# Patient Record
Sex: MALE | Race: WHITE | Employment: OTHER | URBAN - METROPOLITAN AREA
[De-identification: names, ages, dates, MRNs, and addresses within clinical notes are randomized per-mention and may not be internally consistent; named-entity substitution may affect disease eponyms.]

---

## 2020-06-23 ENCOUNTER — HOSPITAL ENCOUNTER (OUTPATIENT)
Dept: ULTRASOUND IMAGING | Age: 70
Discharge: HOME | End: 2020-06-23
Attending: FAMILY MEDICINE
Payer: MEDICARE

## 2020-06-23 DIAGNOSIS — R19.05 PERIUMBILICAL MASS: ICD-10-CM

## 2020-06-23 PROCEDURE — 76705 ECHO EXAM OF ABDOMEN: CPT

## 2021-12-01 ENCOUNTER — OFFICE VISIT (OUTPATIENT)
Dept: NEUROLOGY | Age: 71
End: 2021-12-01
Payer: MEDICARE

## 2021-12-01 VITALS
SYSTOLIC BLOOD PRESSURE: 138 MMHG | DIASTOLIC BLOOD PRESSURE: 69 MMHG | HEART RATE: 70 BPM | RESPIRATION RATE: 14 BRPM | WEIGHT: 152.9 LBS | TEMPERATURE: 97.7 F | OXYGEN SATURATION: 98 % | HEIGHT: 70 IN | BODY MASS INDEX: 21.89 KG/M2

## 2021-12-01 DIAGNOSIS — R41.0 CONFUSION: ICD-10-CM

## 2021-12-01 DIAGNOSIS — R41.3 MEMORY LOSS: ICD-10-CM

## 2021-12-01 DIAGNOSIS — R68.89 OTHER GENERAL SYMPTOMS AND SIGNS: ICD-10-CM

## 2021-12-01 DIAGNOSIS — R41.89 COGNITIVE DECLINE: Primary | ICD-10-CM

## 2021-12-01 PROCEDURE — 1101F PT FALLS ASSESS-DOCD LE1/YR: CPT | Performed by: PSYCHIATRY & NEUROLOGY

## 2021-12-01 PROCEDURE — G8420 CALC BMI NORM PARAMETERS: HCPCS | Performed by: PSYCHIATRY & NEUROLOGY

## 2021-12-01 PROCEDURE — 95816 EEG AWAKE AND DROWSY: CPT | Performed by: PSYCHIATRY & NEUROLOGY

## 2021-12-01 PROCEDURE — 3017F COLORECTAL CA SCREEN DOC REV: CPT | Performed by: PSYCHIATRY & NEUROLOGY

## 2021-12-01 PROCEDURE — G8427 DOCREV CUR MEDS BY ELIG CLIN: HCPCS | Performed by: PSYCHIATRY & NEUROLOGY

## 2021-12-01 PROCEDURE — G8510 SCR DEP NEG, NO PLAN REQD: HCPCS | Performed by: PSYCHIATRY & NEUROLOGY

## 2021-12-01 PROCEDURE — G8536 NO DOC ELDER MAL SCRN: HCPCS | Performed by: PSYCHIATRY & NEUROLOGY

## 2021-12-01 PROCEDURE — 99204 OFFICE O/P NEW MOD 45 MIN: CPT | Performed by: PSYCHIATRY & NEUROLOGY

## 2021-12-01 RX ORDER — ROSUVASTATIN CALCIUM 40 MG/1
TABLET, COATED ORAL
COMMUNITY
Start: 2021-09-25 | End: 2021-12-01

## 2021-12-01 NOTE — PROGRESS NOTES
[facility] and [address] at [facility] at [address] [facility] [address] [place], [address] [facility] [address] [facility], [address] [place] [address]  [phone] Office  [phone]           Referring: Nicolas Hendricks MD  [address] [facility],  [address] [facility]    Chief Complaint   Patient presents with    New Patient     Memory Concerns x 6 months + // worsening      40-year-old gentleman presents today accompanied by his wife for evaluation of memory difficulty. Both provide history. He says that he is just not as sharp as previous. He has been having some difficulty with direction. He notes that he just does not recall as well as he used to. He has difficulty carrying on conversations. He has no difficulty with bills. He does not get lost.  Does not have trouble with the TV remote of the cell phone. His mother had dementia for symptoms starting in his 76s. Wife notes that he is more forgetful. He will forget conversations or will remember conversations differently than they occurred. This has been progressive over the last 6 months and worsening. He has had trouble with forgetting bills. He will set them aside and has to be reminded. He has more difficulty with directions although he is never been good with directions. No dangerous behaviors. She does say that he has been drinking more than he thinks he is drinking and she does not think that he is trying to hide it he just does not realize how much he is drinking. She notes that he has been buying boxes of wine that have 34 glasses in them and while he thinks he is only having 2 or 3 glasses a day he is having large glasses which if you go by standard beverage sizes or serving sizes he is actually having quite a bit more and she is unsure as to how much that may be contributing. No past medical history on file.     No past surgical history on file. He takes no current medicines    No Known Allergies    Social History     Tobacco Use    Smoking status: Former Smoker    Smokeless tobacco: Never Used   Substance Use Topics    Alcohol use: Yes     Comment: wine daily    Drug use: Never       No family history on file. Review of Systems  Pertinent positives and negatives as noted. Examination  Visit Vitals  /69 (BP 1 Location: Left upper arm, BP Patient Position: Sitting)   Pulse 70   Temp 97.7 °F (36.5 °C) (Temporal)   Resp 14   Ht 5' 9.5\" (1.765 m)   Wt 69.4 kg (152 lb 14.4 oz)   SpO2 98%   BMI 22.26 kg/m²   Pleasant gentleman. Appropriately dressed appropriately groomed. Engaging and interactive. He questions whether the month may be March. He says it is 2021. Registration 3/3 recall 0/3 with clues 0/3. He incorrectly says were on the first floor. He knows were in [place]. Cannot tell me the governor but when I give him a clue he says [facility].  He knows the president is Francisca and the previous president was Jose Manuel. He spells the word house forward and backward. Correctly calculates. Follows commands. Cranial nerves intact 2-12. No pronation or drift. Strength is full in the upper and lower extremities in all muscle groups but testing. Reflexes symmetrical upper and lower extremities bilaterally. No pathologic reflexes. No ataxia. Gait steady.     Impression/Plan  60-year-old gentleman with family history of dementia with progressive cognitive difficulty and differential diagnosis certainly includes age-related cognitive decline versus mild cognitive impairment versus early onset dementia versus attention versus emotional versus processing versus secondary to alcohol versus pseudodementia versus structural versus vascular versus other  MRI of the brain  EEG  Carotid Doppler  B12  Thyroid function  RPR  Formal neurocognitive eval  Follow-up after testing    Frederick Medina MD          This note was created using voice recognition software. Despite editing, there may be syntax errors.

## 2021-12-02 LAB
RPR SER QL: NON REACTIVE
T4 FREE SERPL-MCNC: 0.81 NG/DL (ref 0.82–1.77)
TSH SERPL DL<=0.005 MIU/L-ACNC: 2.84 UIU/ML (ref 0.45–4.5)
VIT B12 SERPL-MCNC: 392 PG/ML (ref 232–1245)

## 2021-12-27 ENCOUNTER — TELEPHONE (OUTPATIENT)
Dept: NEUROLOGY | Age: 71
End: 2021-12-27

## 2022-01-05 ENCOUNTER — OFFICE VISIT (OUTPATIENT)
Dept: NEUROLOGY | Age: 72
End: 2022-01-05

## 2022-01-05 DIAGNOSIS — R41.89 COGNITIVE DECLINE: Primary | ICD-10-CM

## 2022-01-05 DIAGNOSIS — R41.0 CONFUSION: ICD-10-CM

## 2022-01-06 NOTE — PROCEDURES
EEG:      Date:  01/05/22    Requesting Physician:  Suzan Gonzalez MD     An EEG is requested in this 70-year-old gentleman to evaluate for epileptiform abnormality. Medications:  Medications are listed as none. This tracing is obtained during the awake state. During wakefulness the background consists of diffuse, low voltage, fast frequency, beta wave activity. Hyperventilation is not performed. Intermittent photic stimulation little alters the tracing. Sleep is not attained. Interpretation:   This EEG recorded during the awake state is normal.

## 2022-01-13 ENCOUNTER — HOSPITAL ENCOUNTER (OUTPATIENT)
Dept: MRI IMAGING | Age: 72
Discharge: HOME | End: 2022-01-13
Attending: PSYCHIATRY & NEUROLOGY
Payer: MEDICARE

## 2022-01-13 DIAGNOSIS — R41.0 CONFUSION: ICD-10-CM

## 2022-01-13 DIAGNOSIS — R68.89 OTHER GENERAL SYMPTOMS AND SIGNS: ICD-10-CM

## 2022-01-13 DIAGNOSIS — R41.89 COGNITIVE DECLINE: ICD-10-CM

## 2022-01-13 DIAGNOSIS — R41.3 MEMORY LOSS: ICD-10-CM

## 2022-01-13 PROCEDURE — 70551 MRI BRAIN STEM W/O DYE: CPT

## 2022-02-03 ENCOUNTER — OFFICE VISIT (OUTPATIENT)
Dept: NEUROLOGY | Age: 72
End: 2022-02-03

## 2022-02-03 VITALS
RESPIRATION RATE: 18 BRPM | DIASTOLIC BLOOD PRESSURE: 84 MMHG | SYSTOLIC BLOOD PRESSURE: 132 MMHG | HEART RATE: 70 BPM | OXYGEN SATURATION: 97 %

## 2022-02-03 DIAGNOSIS — R41.89 COGNITIVE DECLINE: Primary | ICD-10-CM

## 2022-02-03 PROCEDURE — G8427 DOCREV CUR MEDS BY ELIG CLIN: HCPCS | Performed by: PSYCHIATRY & NEUROLOGY

## 2022-02-03 PROCEDURE — 99214 OFFICE O/P EST MOD 30 MIN: CPT | Performed by: PSYCHIATRY & NEUROLOGY

## 2022-02-03 PROCEDURE — G8432 DEP SCR NOT DOC, RNG: HCPCS | Performed by: PSYCHIATRY & NEUROLOGY

## 2022-02-03 PROCEDURE — 1101F PT FALLS ASSESS-DOCD LE1/YR: CPT | Performed by: PSYCHIATRY & NEUROLOGY

## 2022-02-03 PROCEDURE — G8420 CALC BMI NORM PARAMETERS: HCPCS | Performed by: PSYCHIATRY & NEUROLOGY

## 2022-02-03 PROCEDURE — G8536 NO DOC ELDER MAL SCRN: HCPCS | Performed by: PSYCHIATRY & NEUROLOGY

## 2022-02-03 PROCEDURE — 3017F COLORECTAL CA SCREEN DOC REV: CPT | Performed by: PSYCHIATRY & NEUROLOGY

## 2022-02-03 NOTE — PROGRESS NOTES
[facility] and [address] at [facility] at [address] [facility], [address] [facility] [address] [facility], [address] [place] [address]   [phone]              Chief Complaint   Patient presents with    Results        No Known Allergies  Social History     Tobacco Use    Smoking status: Former Smoker    Smokeless tobacco: Never Used   Substance Use Topics    Alcohol use: Yes     Comment: wine daily    Drug use: Never     66-year-old gentleman returns today for follow-up. I saw him for initial consultation regarding memory. We had him undergo several tests  MRI of the brain personally reviewed with temporoparietal atrophy  EEG normal  Carotid Doppler done today 16-49% stenosis bilaterally  B12 normal  RPR nonreactive  TSH normal  Free T4 unremarkable  His formal neuropsychological evaluation is scheduled for July  He is with his wife today. They note his memory is about the same although she does note that he is having more trouble remembering what he does not remember and she gives example that riding in today he noted how its been quite a mild winter and she reminded him of how cold it has been. Examination  Visit Vitals  /84 (BP 1 Location: Left arm, BP Patient Position: Sitting, BP Cuff Size: Adult)   Pulse 70   Resp 18   SpO2 97%     Very pleasant gentleman. Awake alert and oriented. Engaging. No ataxia. Impression/Plan  Progressive cognitive decline question dementia versus pseudodementia versus dementia due to alcoholism versus other  Work-up has been negative for other causes as above  Await neuropsychological eval and will try to get that pushed forward  Follow-up after neuropsychological evaluation    Shelby Lau MD        This note was created using voice recognition software. Despite editing, there may be syntax errors.

## 2022-07-27 ENCOUNTER — OFFICE VISIT (OUTPATIENT)
Dept: NEUROLOGY | Age: 72
End: 2022-07-27
Payer: MEDICARE

## 2022-07-27 DIAGNOSIS — F10.10 EXCESSIVE DRINKING ALCOHOL: ICD-10-CM

## 2022-07-27 DIAGNOSIS — R41.89 COGNITIVE DECLINE: ICD-10-CM

## 2022-07-27 DIAGNOSIS — R90.89 ABNORMAL BRAIN MRI: ICD-10-CM

## 2022-07-27 DIAGNOSIS — F41.8 ANXIETY ABOUT HEALTH: ICD-10-CM

## 2022-07-27 DIAGNOSIS — G31.84 MILD COGNITIVE IMPAIRMENT: Primary | ICD-10-CM

## 2022-07-27 DIAGNOSIS — F43.21 ADJUSTMENT DISORDER WITH DEPRESSED MOOD: ICD-10-CM

## 2022-07-27 DIAGNOSIS — Z81.8 MATERNAL FAMILY HISTORY OF DEMENTIA: ICD-10-CM

## 2022-07-27 DIAGNOSIS — R41.3 SHORT-TERM MEMORY LOSS: ICD-10-CM

## 2022-07-27 PROCEDURE — 1123F ACP DISCUSS/DSCN MKR DOCD: CPT | Performed by: CLINICAL NEUROPSYCHOLOGIST

## 2022-07-27 PROCEDURE — 90791 PSYCH DIAGNOSTIC EVALUATION: CPT | Performed by: CLINICAL NEUROPSYCHOLOGIST

## 2022-07-27 NOTE — PROGRESS NOTES
[address] [facility],[address]  [address]   [address] [place] [address] [place] [address] [address]   [phone] Office   [phone] Fax      Neuropsychology    Initial Diagnostic Interview Note      Referral:  Henrietta Shepard MD    Miriam Barrett is a 67 y.o. right handed  male who was accompanied by his significant other/partner to the initial clinical interview on .7/27/22  Please refer to his medical records for details pertaining to his history. At the start of the appointment, I reviewed the patient's [facility] Epic Chart (including Media scanned in from previous providers) for the active Problem List, all pertinent Past Medical Hx, medications, recent radiologic and laboratory findings. In addition, I reviewed pt's documented Immunization Record and Encounter History. College completed without history of previously diagnosed LD and/or receipt of special education services. He is retired  for [employer]. He has noticed a progressive decline in short term memory. He forgets that he is forgetting. He loses words/names. Loses train of thought. Has been going on gradually the last few years he says, and she says it has been closer to 6 months or so. He just isn't as sharp. No known background stroke, meningitis/encephalitis, MARVEL Fever, Lupus, Lyme, TBI, sz. He has forgotten some bills. He continues to have no issues with driving. He has had no accidents or near misses. He has to get some reminders for his medications, as he is somewhat lax about doing his medications. He does the finances without major issue. He can learn new things at times, like the TV or the remote. He doesn't fall asleep easily, and uses sleep gummies/melatonin to assist.  He drinks wine to go to sleep. He thinks he may have 2-3 glasses a day but these are very, very large glasses and he buys boxes of wine that have 34 glasses in them. He is misses his mother and brother, and he had cared for them, and is probably depressed. He wasn't able to tell me that he was happy today. No counseling or psychiatrist. The house has a lot of memories, and thinks about his family a lot at night. 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 CLINICAL HISTORY: memory loss, confusion. INDICATION: memory loss, confusion. COMPARISON: NONE     TECHNIQUE: MR examination of the brain includes axial and sagittal T1, coronal  T2, axial T2, axial FLAIR, axial gradient echo, axial DWI. CONTRAST: None     FINDINGS:  There is no intracranial mass, hemorrhage or evidence of acute infarction. Pontomesencephalic ratio is within normal limits. Prominence of the  parieto-occipital sulcus. Sulcal and ventricular prominence is temporal and  parietal predominant. No evidence of acute hydrocephalus. Left vertebral artery  is slightly larger than the right vertebral artery. IACs are symmetric in size. The brain architecture is normal. There is no evidence of midline shift or  mass-effect. There are no extra-axial fluid collections. There is no Chiari or  syrinx. The pituitary and infundibulum are grossly unremarkable. There is no  skull base mass. Cerebellopontine angles are grossly unremarkable. The major  intracranial vascular flow-voids are unremarkable. The cavernous sinuses are  symmetric. Optic chiasm and infundibulum grossly unremarkable. Orbits are  grossly symmetric. Dural venous sinuses are grossly patent. The mastoid air cells are well pneumatized and clear. IMPRESSION  Moderate temporal parietal predominant cerebral atrophy for patient age. There is no intracranial mass, hemorrhage or evidence of acute infarction. No acute intracranial process is demonstrated.       Mom had advanced dementia      Used to walk 2, 3 miles per day    Neuropsychological Mental Status Exam (NMSE):      Historian: Good  Praxis: No UE apraxia  R/L Orientation: Intact to self and to other  Dress: within normal limits   Weight: within normal limits   Appearance/Hygiene: within normal limits   Gait: within normal limits   Assistive Devices: None  Mood: within normal limits   Affect: within normal limits   Comprehension: within normal limits   Thought Process: within normal limits   Expressive Language: within normal limits   Receptive Language: within normal limits   Motor:  No cognitive or motor perseveration  ETOH: 2-3 large glasses of wine, I don't think he is abusing purposefully per se but I think he forgets and has some large pours and has been told to cut back and ultimately cut it out  Tobacco: Denied  Illicit: Denied  SI/HI: Denied  Psychosis: Denied  Insight: Within normal limits  Judgment: Within normal limits  Other Psych:      Medical history, surgical history, allergies, family history reviewed and in chart. Plan:  Obtain authorization for testing from insurance company. Report to follow once testing, scoring, and interpretation completed. ? Organic based neurocognitive issues versus mood disorder or combination of same. ? Problems organic, functional, or both? This note will not be viewable in [address]

## 2022-08-25 ENCOUNTER — OFFICE VISIT (OUTPATIENT)
Dept: NEUROLOGY | Age: 72
End: 2022-08-25
Payer: MEDICARE

## 2022-08-25 DIAGNOSIS — R90.89 ABNORMAL BRAIN MRI: ICD-10-CM

## 2022-08-25 DIAGNOSIS — F41.8 ANXIETY ABOUT HEALTH: ICD-10-CM

## 2022-08-25 DIAGNOSIS — F02.80 LATE ONSET ALZHEIMER'S DEMENTIA WITHOUT BEHAVIORAL DISTURBANCE (HCC): Primary | ICD-10-CM

## 2022-08-25 DIAGNOSIS — G30.1 LATE ONSET ALZHEIMER'S DEMENTIA WITHOUT BEHAVIORAL DISTURBANCE (HCC): Primary | ICD-10-CM

## 2022-08-25 PROCEDURE — 96138 PSYCL/NRPSYC TECH 1ST: CPT | Performed by: CLINICAL NEUROPSYCHOLOGIST

## 2022-08-25 PROCEDURE — 96137 PSYCL/NRPSYC TST PHY/QHP EA: CPT | Performed by: CLINICAL NEUROPSYCHOLOGIST

## 2022-08-25 PROCEDURE — 96139 PSYCL/NRPSYC TST TECH EA: CPT | Performed by: CLINICAL NEUROPSYCHOLOGIST

## 2022-08-25 PROCEDURE — 96132 NRPSYC TST EVAL PHYS/QHP 1ST: CPT | Performed by: CLINICAL NEUROPSYCHOLOGIST

## 2022-08-25 PROCEDURE — 96136 PSYCL/NRPSYC TST PHY/QHP 1ST: CPT | Performed by: CLINICAL NEUROPSYCHOLOGIST

## 2022-08-25 PROCEDURE — 96133 NRPSYC TST EVAL PHYS/QHP EA: CPT | Performed by: CLINICAL NEUROPSYCHOLOGIST

## 2022-08-25 NOTE — LETTER
8/31/2022    Patient: Fide West   YOB: 1950   Date of Visit: 8/25/2022     [facility], [address]  [address] [id number]  Via Fax: [phone]    Dear Crow Rivera MD,      Thank you for referring Mr. Fide West to [facility] for evaluation. My notes for this consultation are attached. If you have questions, please do not hesitate to call me. I look forward to following your patient along with you.       Sincerely,    Mariia Calderon PsyD

## 2022-08-31 NOTE — PROGRESS NOTES
[address] [facility],[address]Th [address]  [address]   [address] [facility] [address] [facility] [place] [address] [address]   [phone] Office   [phone] Fax      Neuropsychological Evaluation Report      Referral:  Christin Escoto MD    Darryl Neumann is a 67 y.o. right handed  male who was accompanied by his significant other/partner to the initial clinical interview on .7/27/22  Please refer to his medical records for details pertaining to his history. At the start of the appointment, I reviewed the patient's [facility] Epic Chart (including Media scanned in from previous providers) for the active Problem List, all pertinent Past Medical Hx, medications, recent radiologic and laboratory findings. In addition, I reviewed pt's documented Immunization Record and Encounter History. College completed without history of previously diagnosed LD and/or receipt of special education services. He is retired  for [employer] and [employer]. He has noticed a progressive decline in short term memory. He forgets that he is forgetting. He loses words/names. Loses train of thought. Has been going on gradually the last few years he says, and she says it has been closer to 6 months or so. He just isn't as sharp. No known background stroke, meningitis/encephalitis, MARVEL Fever, Lupus, Lyme, TBI, sz. He has forgotten some bills. He continues to have no issues with driving. He has had no accidents or near misses. He has to get some reminders for his medications, as he is somewhat lax about doing his medications. He does the finances without major issue. He can learn new things at times, like the TV or the remote. He doesn't fall asleep easily, and uses sleep gummies/melatonin to assist.  He drinks wine to go to sleep. He thinks he may have 2-3 glasses a day but these are very, very large glasses and he buys boxes of wine that have 34 glasses in them.    He is misses his mother and brother, and he had cared for them, and is probably depressed. He wasn't able to tell me that he was happy today. No counseling or psychiatrist. The house has a lot of memories, and thinks about his family a lot at night. 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 CLINICAL HISTORY: memory loss, confusion. INDICATION: memory loss, confusion. COMPARISON: NONE     TECHNIQUE: MR examination of the brain includes axial and sagittal T1, coronal  T2, axial T2, axial FLAIR, axial gradient echo, axial DWI. CONTRAST: None     FINDINGS:  There is no intracranial mass, hemorrhage or evidence of acute infarction. Pontomesencephalic ratio is within normal limits. Prominence of the  parieto-occipital sulcus. Sulcal and ventricular prominence is temporal and  parietal predominant. No evidence of acute hydrocephalus. Left vertebral artery  is slightly larger than the right vertebral artery. IACs are symmetric in size. The brain architecture is normal. There is no evidence of midline shift or  mass-effect. There are no extra-axial fluid collections. There is no Chiari or  syrinx. The pituitary and infundibulum are grossly unremarkable. There is no  skull base mass. Cerebellopontine angles are grossly unremarkable. The major  intracranial vascular flow-voids are unremarkable. The cavernous sinuses are  symmetric. Optic chiasm and infundibulum grossly unremarkable. Orbits are  grossly symmetric. Dural venous sinuses are grossly patent. The mastoid air cells are well pneumatized and clear. IMPRESSION  Moderate temporal parietal predominant cerebral atrophy for patient age. There is no intracranial mass, hemorrhage or evidence of acute infarction. No acute intracranial process is demonstrated.       Mom had advanced dementia      Used to walk 2, 3 miles per day    Neuropsychological Mental Status Exam (NMSE):      Historian: Good  Praxis: No UE apraxia  R/L Orientation: Intact to self and to other  Dress: within normal limits   Weight: within normal limits   Appearance/Hygiene: within normal limits   Gait: within normal limits   Assistive Devices: None  Mood: within normal limits   Affect: within normal limits   Comprehension: within normal limits   Thought Process: within normal limits   Expressive Language: within normal limits   Receptive Language: within normal limits   Motor:  No cognitive or motor perseveration  ETOH: 2-3 large glasses of wine, I don't think he is abusing purposefully per se but I think he forgets and has some large pours and has been told to cut back and ultimately cut it out  Tobacco: Denied  Illicit: Denied  SI/HI: Denied  Psychosis: Denied  Insight: Within normal limits  Judgment: Within normal limits  Other Psych:      Medical history, surgical history, allergies, family history reviewed and in chart. Plan:  Obtain authorization for testing from insurance company. Report to follow once testing, scoring, and interpretation completed. ? Organic based neurocognitive issues versus mood disorder or combination of same. ? Problems organic, functional, or both? This note will not be viewable in [address] Neuropsychological Test Results  Patient Testing 8/25/22 Report Completed 8/31/22  A Psychometrist Assisted w/ portions of this evaluation while under my direct  supervision    The following evaluation procedures/tests were administered:      Neuropsychologist Performed, Interpreted, & Reported:  Neuropsychological Mental Status Exam, Revised Memory & Behavior Checklist,  Mini Mental Status Exam, Clock Drawing Test, Jovanna-Melzack Pain Questionnaire, Test Of Premorbid Functioning, History Taking  & Clinical Interview With The Patient, Additional History Taking w/ the Patient's SO, CASE, ABAS-3, NEVA, CPT-III, Review Of Available Records.     Psychometrist Administered under Neuropsychologist Supervision & Neuropsychologist Interpreted & Neuropsychologist Reported:  Verbal Fluency Tests, Sixto & Sixto - Revised, Trailmaking Test Parts A & B, Wechsler Adult Intelligence Scale - IV, [employer] - 3, Grooved Pegboard, García Depression Inventory - II, García Anxiety Inventory. Test Findings:  Test Findings:  Note:  The patients raw data have been compared with currently available norms which include demographic corrections for age, gender, and/or education. Sometimes, the patients scores are compared to demographically similar individuals as close to the patients age, education level, etc., as possible. \"Average\" is viewed as being +/- 1 standard deviation (SD) from the stated mean for a particular test score. \"Low average\" is viewed as being between 1 and 2 SD below the mean, and above average is viewed as being 1 and 2 SD above the mean. Scores falling in the borderline range (between 1-1/2 and 2 SD below the mean) are viewed with particular attention as to whether they are normal or abnormal neurocognitive test scores. Other methods of inference in analyzing the test data are also utilized, including the pattern and range of scores in the profile, bilateral motor functions, and the presence, if any, of pathognomonic signs. Behaviorally, the patient was friendly and cooperative and appeared motivated to perform well during this examination. Within this context, the results of this evaluation are viewed as a valid reflection of the patients actual neurocognitive and emotional status. His MMSE score of 23/30 correct was impaired. In this regard, he was not oriented to day, date, county, city, or floor. Recall 2/3 correct. Visual construction was impaired. Clock drawing was normal.      Category fluency was within the moderately impaired range with a T score of 26. Confrontation naming, as assessed by the positive test-revised, was within the mildly impaired range with a T score of 35.   This pattern of performance is indicative of a patient who is at increased risk for day-to-day problems with verbal fluency and confrontation naming. The patient was administered the [facility] Continuous Performance Test - III,a computer administered test of sustained attention, and review of the subscales within this instrument revealed numerous concerns for inattentiveness with impulsivity. This pattern of performance is indicative of a patient who is at increased risk for day-to-day problems with sustained visual attention/concentration. The patient is not showing problems with working memory capacity (50th %ile) although processing speed (8th %ile) was borderline on the WAIS-IV. His Verbal Comprehension Index score of 100 was average. His Perceptual Reasoning Index score of 96 was average. These scores do not reflect a decline in functioning based on an assessment of premorbid functioning. The patient was administered the [facility] Verbal Learning Test  - 3 and generated an impaired range (and flat) learning curve over five repeated auditory word list learning trials. An interference trial was within the normal range. Free and cued, short and long delayed recall were all impaired. Recognition recall was impaired forced choice recall was low normal (14/16 correct), suggesting good effort. This pattern of performance is indicative of a patient who is at increased risk for day-to-day problems with auditory learning and memory. Simple timed visual motor sequencing (Trailmaking Test Part A) was within the below average range with a T score of 43. His performance on a similar, but more complex task of timed visual motor sequencing (Trailmaking Test Part B) was within the mildly to moderately impaired range with a T score of 33.   He made 3 sequencing errors on this latter completed test.  Taken together, this pattern of performance is not strongly indicative of a patient who is at increased risk for day-to-day problems with executive functioning. Psychomotor slowing and attention problems are noted, however. Fine motor dexterity was within the mildly to moderately impaired range bilaterally. This does not raise, concern for particularly focal or lateralized brain dysfunction. Neurologic correlation is indicated. The patient rated his current level of pain as \"0/5-no pain\" on the Jovanna-Melzack Pain Questionnaire. His García Depression Inventory- II score of 7 was within the normal range. His García Anxiety Inventory score of 11 reflected mild anxiety. The patient was administered the Personality Assessment Inventory and generated a valid profile for interpretation. Within this context, he reports that alcohol use has been a source of difficulty for him. He is distant in those relationships that are maintained. His self-reported level of treatment motivation is low. The SO completed the ABAS-3 and reported borderline range concerns regarding the patient's conceptual skills are (SS = 78). Other adaptive domains are reported as below average. On the CASE, the SO reported concerns for substance abuse, OCD, and cognitive functioning. Impressions & Recommendations: This patient generated an abnormal range Neuropsychological Evaluation with respect to neurocognitive functioning. In this regard, he is showing problems with mental status, verbal fluency, confrontation naming, sustained attention, processing speed, auditory learning, auditory memory, bilateral motor skills. Casual language skills will serve to mask underlying cognitive deficits at times. Emotionally, the patient denied clinically significant psychopathology. Alcohol use has been a source of difficulty for him. He has subsequently stopped drinking, per his report. In my opinion, this profile is consistent with an evolving organic process that is currently at a moderate level of severity. This is likely Alzheimer's.     In addition to continued medical care, my recommendations include consideration for memory management medication. Also recommend consideration for treatment of his moderate to severe attention deficit issues as well. This is, of course, if that is not medically contraindicated. His emotional status needs to be monitored over time. The patient should be encouraged to remain as mentally, socially, and physically active as possible. The patient's generated cognitive difficulties are significant to the degree whereby it is my opinion that he should not live independently without appropriate supervision for those domains with a heavy memory emphasis. This includes medication management supervision and supervision of financial dealings. Marked problems with attention and reaction time raise concern regarding driving safety, and I suggest consideration for a formal evaluation of same. I find that he currently maintains capacity, but may wish to assign a POA if this has not been done so already. Baseline now established. Follow up prn. Clinical correlation is, of course, indicated. I will discuss these findings with the patient and family when they follow up with me in the near future. A follow up Neuropsychological Evaluation is indicated on a prn basis. DIAGNOSES: Dementia - Moderate    Alcohol Use in Remission     The above information is based upon information currently available to me. If there is any additional information of which I am currently unaware, I would be more than happy to review it upon having it made available to me. Thank you for the opportunity to see this interesting individual.     Sincerely,       Ever Cap. Eliel Bronson PsyD, EdS      Attachments:  IQ Test Results (In Media Section Of This EMR)    Cc: Mariia Doherty MD    Time Documentation:    42149*0 79202*6 (60 minutes)    27887 x 1  96139 x 5 Test Administration/Data Gathering By Technician: (3 hours).  32880 x 1 (first 30 minutes), 18870 x 5 (each additional 30 minutes)    96132 x 1  96133 x 1 Testing Evaluation Services by Neuropsychologist (1 hour, 50 minutes) 96132 x 1 (first hour), 96133 x 1 (50 minutes)    Definitions:      32533/51734:  Neurobehavioral Status Exam, Clinical interview. Clinical assessment of thinking, reasoning and judgment, by neuropsychologist, both face to face time with patient and time interpreting those test results and reporting, first and subsequent hours)    25898/66296: Neuropsychological Test Administration by Technician/Psychometrist, first 30 minutes and each additional 30 minutes. The above includes: Record review. Review of history provided by patient. Review of collaborative information. Testing by Clinician. Review of raw data. Scoring. Report writing of individual tests administered by Clinician. Integration of individual tests administered by psychometrist with NSE/testing by clinician, review of records/history/collaborative information, case Conceptualization, treatment planning, clinical decision making, report writing, coordination Of Care. Psychometry test codes as time spent by psychometrist administering and scoring neurocognitive/psychological tests under supervision of neuropsychologist.  Integral services including scoring of raw data, data interpretation, case conceptualization, report writing etcetera were initiated after the patient finished testing/raw data collected and was completed on the date the report was signed. Please note that this dictation was completed with [employer], the [employer] voice recognition software. Quite often unanticipated grammatical, syntax, homophones, and other interpretive errors are inadvertently transcribed by the computer software. Please disregard these errors. Please excuse any errors that have escaped final proofreading. Thank you.

## 2022-11-11 ENCOUNTER — OFFICE VISIT (OUTPATIENT)
Dept: NEUROLOGY | Age: 72
End: 2022-11-11
Payer: MEDICARE

## 2022-11-11 DIAGNOSIS — F41.8 ANXIETY ABOUT HEALTH: ICD-10-CM

## 2022-11-11 DIAGNOSIS — G30.1 LATE ONSET ALZHEIMER'S DEMENTIA WITHOUT BEHAVIORAL DISTURBANCE (HCC): Primary | ICD-10-CM

## 2022-11-11 DIAGNOSIS — F02.80 LATE ONSET ALZHEIMER'S DEMENTIA WITHOUT BEHAVIORAL DISTURBANCE (HCC): Primary | ICD-10-CM

## 2022-11-11 DIAGNOSIS — R90.89 ABNORMAL BRAIN MRI: ICD-10-CM

## 2022-11-11 PROCEDURE — 90832 PSYTX W PT 30 MINUTES: CPT | Performed by: CLINICAL NEUROPSYCHOLOGIST

## 2022-11-11 PROCEDURE — 1123F ACP DISCUSS/DSCN MKR DOCD: CPT | Performed by: CLINICAL NEUROPSYCHOLOGIST

## 2022-11-11 NOTE — PROGRESS NOTES
Prior to seeing the patient I reviewed the records, including the previously completed report, the records in [place], and any updated visits from other providers since I saw the patient last.      Today, I engaged in a psychoeducational and supportive and cognitive/behavioral psychotherapy session with the patient. And spouse. I provided psychotherapy in the form of psychoeducation and support with respect to the results of the recent Neuropsychological Evaluation, including discussing individual tests as well as patient's areas of neurocognitive strength versus weakness. We discussed, in detail, the following: This patient generated an abnormal range Neuropsychological Evaluation with respect to neurocognitive functioning. In this regard, he is showing problems with mental status, verbal fluency, confrontation naming, sustained attention, processing speed, auditory learning, auditory memory, bilateral motor skills. Casual language skills will serve to mask underlying cognitive deficits at times. Emotionally, the patient denied clinically significant psychopathology. Alcohol use has been a source of difficulty for him. He has subsequently stopped drinking, per his report. In my opinion, this profile is consistent with an evolving organic process that is currently at a moderate level of severity. This is likely Alzheimer's. In addition to continued medical care, my recommendations include consideration for memory management medication. Also recommend consideration for treatment of his moderate to severe attention deficit issues as well. This is, of course, if that is not medically contraindicated. His emotional status needs to be monitored over time. The patient should be encouraged to remain as mentally, socially, and physically active as possible.                    The patient's generated cognitive difficulties are significant to the degree whereby it is my opinion that he should not live independently without appropriate supervision for those domains with a heavy memory emphasis. This includes medication management supervision and supervision of financial dealings. Marked problems with attention and reaction time raise concern regarding driving safety, and I suggest consideration for a formal evaluation of same. I find that he currently maintains capacity, but may wish to assign a POA if this has not been done so already. Baseline now established. Follow up prn. Clinical correlation is, of course, indicated. I will discuss these findings with the patient and family when they follow up with me in the near future. A follow up Neuropsychological Evaluation is indicated on a prn basis. DIAGNOSES: Dementia - Moderate                          Alcohol Use in Remission         Education was provided regarding my diagnostic impressions, and we discussed treatment plan/options. I also answered numerous questions related to the clinical findings, including discussing various methods to improve cognition and mood. Counseling provided regarding mood and cognition. CBT and supportive psychotherapy techniques were utilized. Supportive/Cognitive Behavioral/Solution Focused psychotherapy provided  Discussed rational versus irrational thinking patterns and their consequences. Discussed healthy/adaptive and unhealthy/maladaptive coping. The patient needs to follow with Neuro, psychology as needed. Supervision and respite care for spouse. Driving maintains local.  Advised at length to completely stop drinking. Cont to play piano.        The patient had the following concerns which I deferred to their referring provider: meds for mood/cognition      Time spent today: 20

## 2023-03-02 ENCOUNTER — OFFICE VISIT (OUTPATIENT)
Dept: NEUROLOGY | Age: 73
End: 2023-03-02

## 2023-03-02 VITALS
RESPIRATION RATE: 14 BRPM | SYSTOLIC BLOOD PRESSURE: 167 MMHG | HEART RATE: 83 BPM | DIASTOLIC BLOOD PRESSURE: 88 MMHG | OXYGEN SATURATION: 96 %

## 2023-03-02 DIAGNOSIS — F02.80 LATE ONSET ALZHEIMER'S DEMENTIA WITHOUT BEHAVIORAL DISTURBANCE (HCC): Primary | ICD-10-CM

## 2023-03-02 DIAGNOSIS — G30.1 LATE ONSET ALZHEIMER'S DEMENTIA WITHOUT BEHAVIORAL DISTURBANCE (HCC): Primary | ICD-10-CM

## 2023-03-02 RX ORDER — ROSUVASTATIN CALCIUM 40 MG/1
TABLET, COATED ORAL
COMMUNITY

## 2023-03-02 RX ORDER — DONEPEZIL HYDROCHLORIDE 10 MG/1
TABLET, FILM COATED ORAL
COMMUNITY
Start: 2023-01-09

## 2023-03-02 RX ORDER — DONEPEZIL HYDROCHLORIDE 5 MG/1
5 TABLET, FILM COATED ORAL DAILY
Qty: 30 TABLET | Refills: 0 | Status: SHIPPED | OUTPATIENT
Start: 2023-03-02 | End: 2023-04-01

## 2023-03-02 NOTE — PROGRESS NOTES
[facility] and [address] at [facility] at [address] [facility], [address] [facility] [address] [facility], [address] [place] [address]   [phone]              Chief Complaint   Patient presents with    Results     Current Outpatient Medications   Medication Sig Dispense Refill    rosuvastatin (CRESTOR) 40 mg tablet TAKE 1 TABLET BY MOUTH EVERY DAY Oral for 90      donepeziL (ARICEPT) 10 mg tablet TAKE 1 TABLET BY MOUTH EVERY DAY AT BEDTIME FOR 30 DAYS        No Known Allergies  Social History     Tobacco Use    Smoking status: Former    Smokeless tobacco: Never   Substance Use Topics    Alcohol use: Yes     Comment: wine daily    Drug use: Never     70-year-old gentleman returns today with his wife for follow-up regarding progressive cognitive decline question dementia versus pseudodementia versus other  MRI of the brain January 13, 2022 unremarkable  Carotid Doppler unremarkable  EEG from January 7, 2022 normal    Formal neuropsychological evaluation performed August 25, 2022 finds an abnormal range profile consistent with Alzheimer's. He is drinking he says 2 drinks of alcohol a night. Wife seems to indicate perhaps more. He was started on Aricept 5 mg daily by Dr. Jonnie Goldsmith and was to increase to 10 mg but then his wife noted that it mentions side effects of dizziness and he already gets stumbling at night when he drinks and so she was afraid to give it to him. He has been off it for a while now. Examination  There were no vitals taken for this visit. Visit Vitals  BP (!) 174/101   Pulse 83   Resp 14   SpO2 96%     He is awake alert and engaging. Obvious short-term memory difficulty.     Impression/Plan  Alzheimer's type dementia  Reinstitute Aricept at 5 mg titrating to 10 mg  Answered his wife's questions regarding potential side effects  He will take the Aricept in the morning  Discussed dual therapy and rationale for therapy and expectations  Literature given regarding Alzheimer's and I recommended the book 36-hour day  Follow-up in 3 months and start Namenda  We discussed scaling back on his alcohol to no more than 1 drink at night    Blanka David MD        This note was created using voice recognition software. Despite editing, there may be syntax errors.

## 2023-06-02 ENCOUNTER — OFFICE VISIT (OUTPATIENT)
Age: 73
End: 2023-06-02
Payer: MEDICARE

## 2023-06-02 VITALS
HEART RATE: 76 BPM | OXYGEN SATURATION: 98 % | SYSTOLIC BLOOD PRESSURE: 126 MMHG | RESPIRATION RATE: 20 BRPM | DIASTOLIC BLOOD PRESSURE: 84 MMHG | TEMPERATURE: 97.3 F

## 2023-06-02 DIAGNOSIS — G30.1 ALZHEIMER'S DISEASE WITH LATE ONSET (CODE) (HCC): Primary | ICD-10-CM

## 2023-06-02 PROCEDURE — 3017F COLORECTAL CA SCREEN DOC REV: CPT

## 2023-06-02 PROCEDURE — 1123F ACP DISCUSS/DSCN MKR DOCD: CPT

## 2023-06-02 PROCEDURE — G8427 DOCREV CUR MEDS BY ELIG CLIN: HCPCS

## 2023-06-02 PROCEDURE — G8421 BMI NOT CALCULATED: HCPCS

## 2023-06-02 PROCEDURE — 1036F TOBACCO NON-USER: CPT

## 2023-06-02 PROCEDURE — 99213 OFFICE O/P EST LOW 20 MIN: CPT

## 2023-06-02 RX ORDER — MEMANTINE HYDROCHLORIDE 10 MG/1
10 TABLET ORAL 2 TIMES DAILY
Qty: 180 TABLET | Refills: 1 | Status: CANCELLED | OUTPATIENT
Start: 2023-06-30

## 2023-06-02 RX ORDER — MEMANTINE HYDROCHLORIDE 5 MG/1
TABLET ORAL
Qty: 70 TABLET | Refills: 0 | Status: CANCELLED | OUTPATIENT
Start: 2023-06-02

## 2023-06-02 RX ORDER — ASPIRIN 325 MG
325 TABLET ORAL DAILY
COMMUNITY

## 2023-06-02 NOTE — PROGRESS NOTES
Dago Ruano is a 67 y.o. male who presents with the following  Chief Complaint   Patient presents with    Follow-up     Memantine start        HPI  Patient is here today with his wife for Aricept restart and Alzheimer's follow-up. Neurocognitive testing in August 2022 that showed Alzheimer's disease. Patient was last seen March 2, 2023 by Dr. Thiago Stern at which time the patient's wife stated that she had stopped giving the Aricept (that was prescribed by his PCP) due to side effects of dizziness. She stated that he drinks alcohol at night and is already unsteady on his feet. He had been off of the medication a while per the patient's wife. Dr. Thiago Stern advised the patient's wife to put him back on it but give him the medication in the morning instead of at nighttime. Today the patient's wife states that they did restart the Aricept for the patient however he was off of it for probably a good month because April 13 he got very sick with pericarditis and was not eating or drinking well or taking his medications for about a month. He lost 12 pounds per the patient's wife. She says he just started taking the 10 mg dose again about 2 weeks ago consistently and is tolerating it okay. He is feeling better and eating better now. He does not remember being sick. He was seeing Dr. Nigel Yang for cardiology who placed him on aspirin 325 mg daily. Today the patient can tell me his date of birth, where he has, the floor that he is on, the year, the day of the week, the current president, the previous president, that he has 1 child and his name. He could not tell me the month. 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 He is about to have cataract surgery July 13 and July 20.     Not on File    Current Outpatient Medications   Medication Sig Dispense Refill    aspirin 325 MG tablet

## 2023-12-07 ENCOUNTER — OFFICE VISIT (OUTPATIENT)
Age: 73
End: 2023-12-07

## 2023-12-07 VITALS
HEART RATE: 73 BPM | RESPIRATION RATE: 18 BRPM | SYSTOLIC BLOOD PRESSURE: 138 MMHG | DIASTOLIC BLOOD PRESSURE: 61 MMHG | OXYGEN SATURATION: 98 %

## 2023-12-07 DIAGNOSIS — G30.1 MODERATE LATE ONSET ALZHEIMER'S DEMENTIA WITHOUT BEHAVIORAL DISTURBANCE, PSYCHOTIC DISTURBANCE, MOOD DISTURBANCE, OR ANXIETY (HCC): Primary | ICD-10-CM

## 2023-12-07 DIAGNOSIS — F02.B0 MODERATE LATE ONSET ALZHEIMER'S DEMENTIA WITHOUT BEHAVIORAL DISTURBANCE, PSYCHOTIC DISTURBANCE, MOOD DISTURBANCE, OR ANXIETY (HCC): Primary | ICD-10-CM

## 2023-12-07 RX ORDER — DONEPEZIL HYDROCHLORIDE 10 MG/1
TABLET, FILM COATED ORAL
Qty: 90 TABLET | Refills: 3 | Status: SHIPPED | OUTPATIENT
Start: 2023-12-07

## 2025-03-03 RX ORDER — DONEPEZIL HYDROCHLORIDE 10 MG/1
TABLET, FILM COATED ORAL
Qty: 30 TABLET | Refills: 0 | Status: SHIPPED | OUTPATIENT
Start: 2025-03-03

## 2025-04-06 RX ORDER — DONEPEZIL HYDROCHLORIDE 10 MG/1
10 TABLET, FILM COATED ORAL
Qty: 90 TABLET | Refills: 1 | Status: SHIPPED | OUTPATIENT
Start: 2025-04-06

## 2025-07-20 ENCOUNTER — OFFICE VISIT (OUTPATIENT)
Age: 75
End: 2025-07-20

## 2025-07-20 VITALS
WEIGHT: 143 LBS | DIASTOLIC BLOOD PRESSURE: 81 MMHG | SYSTOLIC BLOOD PRESSURE: 131 MMHG | HEIGHT: 70 IN | HEART RATE: 59 BPM | BODY MASS INDEX: 20.47 KG/M2 | TEMPERATURE: 97.6 F | RESPIRATION RATE: 19 BRPM | OXYGEN SATURATION: 95 %

## 2025-07-20 DIAGNOSIS — S81.812A LACERATION OF LEFT LOWER EXTREMITY, INITIAL ENCOUNTER: Primary | ICD-10-CM

## 2025-07-20 NOTE — PATIENT INSTRUCTIONS
Thank you for visiting [facility] today.     Beginning tomorrow, wash twice daily with soap and water.  Apply thin layer of bacitracin or Neosporin.  Cover with nonadhesive bandage.  You may use the Coban, tape or gauze to secure the bandage in place.    A survey will be sent shortly to your phone/email.  Please complete this so we may know how to better serve you in the future.     If you begin to have shortness of breath, chest pain or uncontrollable fever of 100.4 or greater, please go to the Emergency Room.

## 2025-07-20 NOTE — PROGRESS NOTES
Subjective     Chief Complaint   Patient presents with    Leg Injury     Patient presents today for cut to the back of his left leg from a tree branch , it is still bleeding, happened around 12:30pm       Patient is a 75-year-old male presenting with a laceration to the left lower leg.  He was outside and scraped his leg on a tree branch.  He reports tetanus up-to-date.  He has had difficulty getting the bleeding under control.  Denies any blood thinners.  Takes daily aspirin.      Leg Injury      History reviewed. No pertinent past medical history.    History reviewed. No pertinent surgical history.    History reviewed. No pertinent family history.    No Known Allergies    Social History     Tobacco Use    Smoking status: Former    Smokeless tobacco: Never   Substance Use Topics    Alcohol use: Yes    Drug use: Never       Vitals:    07/20/25 1421   BP: 131/81   Pulse: 59   Resp: 19   Temp: 97.6 °F (36.4 °C)   SpO2: 95%       Objective     Physical Exam  Vitals and nursing note reviewed.   Constitutional:       General: He is not in acute distress.     Appearance: Normal appearance. He is not ill-appearing.   HENT:      Head: Normocephalic and atraumatic.   Cardiovascular:      Rate and Rhythm: Bradycardia present.      Pulses: Normal pulses.   Pulmonary:      Effort: Pulmonary effort is normal.   Musculoskeletal:        Legs:       Comments: laceration   Skin:     General: Skin is warm and dry.   Neurological:      Mental Status: He is alert and oriented to person, place, and time.     Assessment & Plan     Diagnoses and all orders for this visit:  Laceration of left lower extremity, initial encounter    Wound cleansed, topical antibiotic applied and bandaged  Instructions provided on wound care at home  Follow up with PCP as needed  Discussed conditions which warrant ER visit    I have discussed the results, diagnosis and treatment plan with the patient.  The patient also understands that early in the process of

## 2025-07-21 PROBLEM — D23.9 BENIGN NEOPLASM OF SKIN: Status: ACTIVE | Noted: 2025-07-21

## 2025-07-21 PROBLEM — L57.0 ACTINIC KERATOSIS: Status: ACTIVE | Noted: 2025-07-21

## 2025-07-21 PROBLEM — R00.2 PALPITATIONS: Status: ACTIVE | Noted: 2025-07-21

## 2025-07-21 PROBLEM — D22.5 MELANOCYTIC NEVUS OF TRUNK: Status: ACTIVE | Noted: 2025-07-21

## 2025-07-21 PROBLEM — Z85.828 HISTORY OF MALIGNANT NEOPLASM OF SKIN: Status: ACTIVE | Noted: 2025-07-21

## 2025-07-21 PROBLEM — D48.5 NEOPLASM OF UNCERTAIN BEHAVIOR OF SKIN: Status: ACTIVE | Noted: 2025-07-21

## 2025-07-21 PROBLEM — Z85.828 HISTORY OF BASAL CELL CARCINOMA (BCC): Status: ACTIVE | Noted: 2025-07-21

## 2025-07-21 PROBLEM — F02.80 ALZHEIMER'S DISEASE (HCC): Status: ACTIVE | Noted: 2025-07-21

## 2025-07-21 PROBLEM — E78.5 DYSLIPIDEMIA: Status: ACTIVE | Noted: 2025-07-21

## 2025-07-21 PROBLEM — G30.9 ALZHEIMER'S DISEASE (HCC): Status: ACTIVE | Noted: 2025-07-21

## 2025-07-21 PROBLEM — I25.10 CORONARY ARTERIOSCLEROSIS: Status: ACTIVE | Noted: 2025-07-21

## 2025-07-21 PROBLEM — J44.9 CHRONIC OBSTRUCTIVE PULMONARY DISEASE (HCC): Status: ACTIVE | Noted: 2025-07-21

## 2025-07-21 PROBLEM — R07.9 CHEST PAIN: Status: ACTIVE | Noted: 2023-04-13

## 2025-07-21 PROBLEM — L82.0 INFLAMED SEBORRHEIC KERATOSIS: Status: ACTIVE | Noted: 2025-07-21

## 2025-07-21 PROBLEM — F03.90 DEMENTIA (HCC): Status: ACTIVE | Noted: 2025-07-21

## 2025-07-21 PROBLEM — L82.1 OTHER SEBORRHEIC KERATOSIS: Status: ACTIVE | Noted: 2025-07-21

## 2025-07-21 PROBLEM — D17.1 LIPOMA OF SKIN AND SUBCUTANEOUS TISSUE OF TRUNK: Status: ACTIVE | Noted: 2025-07-21
